# Patient Record
Sex: FEMALE | Race: WHITE
[De-identification: names, ages, dates, MRNs, and addresses within clinical notes are randomized per-mention and may not be internally consistent; named-entity substitution may affect disease eponyms.]

---

## 2023-07-07 ENCOUNTER — HOSPITAL ENCOUNTER (OUTPATIENT)
Dept: HOSPITAL 47 - LABMAIN | Age: 63
Discharge: HOME | End: 2023-07-07
Attending: EMERGENCY MEDICINE
Payer: COMMERCIAL

## 2023-07-07 ENCOUNTER — HOSPITAL ENCOUNTER (INPATIENT)
Dept: HOSPITAL 47 - 3MHU | Age: 63
LOS: 6 days | Discharge: HOME | DRG: 750 | End: 2023-07-13
Attending: PSYCHIATRY & NEUROLOGY | Admitting: PSYCHIATRY & NEUROLOGY
Payer: MEDICAID

## 2023-07-07 DIAGNOSIS — F20.0: Primary | ICD-10-CM

## 2023-07-07 DIAGNOSIS — N39.0: ICD-10-CM

## 2023-07-07 DIAGNOSIS — Z91.040: ICD-10-CM

## 2023-07-07 DIAGNOSIS — K52.1: ICD-10-CM

## 2023-07-07 DIAGNOSIS — Z20.822: Primary | ICD-10-CM

## 2023-07-07 DIAGNOSIS — Z79.899: ICD-10-CM

## 2023-07-07 DIAGNOSIS — B96.1: ICD-10-CM

## 2023-07-07 DIAGNOSIS — T36.95XA: ICD-10-CM

## 2023-07-07 DIAGNOSIS — Z71.41: ICD-10-CM

## 2023-07-07 DIAGNOSIS — Z71.51: ICD-10-CM

## 2023-07-07 DIAGNOSIS — R35.0: ICD-10-CM

## 2023-07-07 DIAGNOSIS — I10: ICD-10-CM

## 2023-07-07 DIAGNOSIS — E78.5: ICD-10-CM

## 2023-07-07 DIAGNOSIS — G47.00: ICD-10-CM

## 2023-07-07 DIAGNOSIS — N39.41: ICD-10-CM

## 2023-07-07 PROCEDURE — 80061 LIPID PANEL: CPT

## 2023-07-07 PROCEDURE — 87186 SC STD MICRODIL/AGAR DIL: CPT

## 2023-07-07 PROCEDURE — 81001 URINALYSIS AUTO W/SCOPE: CPT

## 2023-07-07 PROCEDURE — 84443 ASSAY THYROID STIM HORMONE: CPT

## 2023-07-07 PROCEDURE — 87077 CULTURE AEROBIC IDENTIFY: CPT

## 2023-07-07 PROCEDURE — 87086 URINE CULTURE/COLONY COUNT: CPT

## 2023-07-07 PROCEDURE — 83036 HEMOGLOBIN GLYCOSYLATED A1C: CPT

## 2023-07-07 PROCEDURE — 87635 SARS-COV-2 COVID-19 AMP PRB: CPT

## 2023-07-07 RX ADMIN — ATORVASTATIN CALCIUM SCH MG: 10 TABLET, FILM COATED ORAL at 21:02

## 2023-07-07 RX ADMIN — NICOTINE SCH: 14 PATCH, EXTENDED RELEASE TRANSDERMAL at 17:26

## 2023-07-08 LAB
CHOLEST SERPL-MCNC: 223 MG/DL (ref 0–200)
HDLC SERPL-MCNC: 30.5 MG/DL (ref 40–60)
LDLC SERPL CALC-MCNC: 148.3 MG/DL (ref 0–131)
PH UR: 8.5 [PH] (ref 5–8)
RBC UR QL: 2 /HPF (ref 0–5)
SP GR UR: 1.01 (ref 1–1.03)
TRIGL SERPL-MCNC: 221 MG/DL (ref 0–149)
UROBILINOGEN UR QL STRIP: <2 MG/DL (ref ?–2)
VLDLC SERPL CALC-MCNC: 44.2 MG/DL (ref 5–40)
WBC #/AREA URNS HPF: 71 /HPF (ref 0–5)

## 2023-07-08 RX ADMIN — NICOTINE SCH: 14 PATCH, EXTENDED RELEASE TRANSDERMAL at 08:52

## 2023-07-08 RX ADMIN — ATORVASTATIN CALCIUM SCH MG: 10 TABLET, FILM COATED ORAL at 21:05

## 2023-07-08 RX ADMIN — METOPROLOL SUCCINATE SCH MG: 50 TABLET, EXTENDED RELEASE ORAL at 08:52

## 2023-07-08 NOTE — P.CONS
History of Present Illness





- Reason for Consult


Consult date: 07/08/23





- History of Present Illness





The patient is a 62-year-old female with a PMH of hyperlipidemia who was 

transferred from an outside facility with the patient was brought in for 

paranoid behavior.  The patient was seen at the mental health unit.  She reports

that Genaro Nettles the  is trying to get her killed.  She reports 

that he has caused collapse of the economy many times and that the staff at the 

hospital and others in her neighborhood are all working with him to plan her 

assassination.  She denied any physical complaints at the time of interview.  

Denied experiencing chest discomfort, shortness of breath, fever, chills, cough,

nausea, vomiting, abdominal pain, diarrhea.  She denies tobacco, alcohol, or 

substance use.





Review of systems:


Pertinent positives and negatives as discussed in HPI, a complete review of 

systems was performed and all other systems are negative.





Physical examination:


General: non toxic, no distress, appears at stated age, normal weight


Derm: no unusual rashes/lesions, no unusual ecchymoses, warm, dry


Head: atraumatic, normocephalic, symmetric


Eyes: EOMI, no lid lag, anicteric sclera


ENT: Nose and ears atraumatic, no thrush,  no pharyngeal erythema


Neck: trachea midline, supple


Mouth: no lip lesion, mucus membranes moist


Cardiovascular: S1S2 reg, no murmur, no edema


Lungs: CTA bilateral, no rhonchi, no rales , no accessory muscle use


Abdominal: soft,  nontender to palpation, no guarding


Ext: no gross muscle atrophy, no contractures, 


Neuro: No gross focal neuro deficits noted 


Psych: Alert, oriented, appropriate affect 





Assessment:


Chronic conditions: Hyper lipidemia


Paranoid delusion





Imaging:


None performed





Plan:


Continue home medications Lipitor and Lopressor


Defer management of delusion to primary psychiatry service





Thank you for allowing us to participate in the care of this patient.  We will 

follow peripherally.  Do not hesitate to contact us with questions.  Someone can

be reached from the [facility] hospitalist group at all hours of the day 

at [phone].





Past Medical History


History of Any Multi-Drug Resistant Organisms: None Reported


Smoking Status: Never smoker





- Past Family History


  ** Father


Family Medical History: Unable to Obtain (patient refused)





Medications and Allergies


                                Home Medications











 Medication  Instructions  Recorded  Confirmed  Type


 


Atorvastatin Calcium 10 mg PO HS 07/07/23 07/07/23 History


 


Metoprolol Tartrate [Lopressor] 50 mg PO DAILY 07/07/23 07/07/23 History


 


busPIRone HCL [Buspirone HCl] 15 mg PO TID 07/07/23 07/07/23 History








                                    Allergies











Allergy/AdvReac Type Severity Reaction Status Date / Time


 


latex Allergy  Unknown Verified 07/07/23 15:57














Physical Exam


Vitals: 


                                   Vital Signs











  Temp Pulse Pulse Resp BP BP


 


 07/07/23 21:04    95   148/79 


 


 07/07/23 18:24  97.3 F L  90   16   140/80


 


 07/07/23 18:08  97.3 F L  90   16   140/80








                                Intake and Output











 07/07/23 07/07/23 07/08/23





 14:59 22:59 06:59


 


Other:   


 


  Weight  71.804 kg

## 2023-07-08 NOTE — P.HP
Psychiatric H&P





- .


H&P Date: 07/08/23


History & Physical: 


                                    Allergies











Allergy/AdvReac Type Severity Reaction Status Date / Time


 


latex Allergy  Unknown Verified 07/07/23 15:57








                                   Vital Signs











Temp  97.6 F   07/08/23 09:00


 


Pulse  99   07/08/23 09:00


 


Resp  16   07/08/23 09:00


 


BP  128/76   07/08/23 09:00


 


Pulse Ox      


 


FiO2      








                                 Intake & Output











 07/07/23 07/08/23 07/08/23





 18:59 06:59 18:59


 


Weight 71.804 kg  











07/08/23 11:30


IDENTIFYING DATA: Patient is a 62-year-old  female who lives with her 

dad.  She is admitted for delusions: She is paranoid stating that Genaro Eli 

is trying to kick kill her and that she caused the collapse of the economy





HPI: patient states that Genaro Eli has hired has tried to kill her many times

since 2021  She states that Genaro Eli has lost billions of dollars because of

her.  She states that 50-60 cars followed her. she denies suicidal thoughts, 

homicidal thoughts, current or past hallucinations (although petition reports 

hallucinations), current or past manic episodes. Also denies substance use





PAST PSYCHIATRIC HISTORY: reports that she has been hospitalized for a similar 

presentation in the past.  States that she is on BuSpar 15 mg 3 times a day, 

Seroquel (which she states works well but she keeps running out of, last took it

one week ago), Ambien, and Xanax. Per [facility], had Ambien filled on 5/31/23, 30 day

supply. [Patient denies any psychiatric outpatient follow-up.] [Patient denies 

any history of suicide attempts in the past.]





PMH:[denies]





ALLERGIES: as per EMR





CHEMICAL DEPENDENCY HISTORY: as per HPI





FAMILY PSYCHIATRIC/SUBSTANCE USE HISTORY:  states that mom was in the 

psychiatric hospital for paranoia and she also received ECT.  States that her 

father states that she is having similar symptoms to her mother





SOCIAL HISTORY: lives with her father.  Denies any legal issues





MENTAL STATUS EXAM: 


General Appearance: Patient appears to be stated age is alert, [directable, and 

attempts to cooperate]. 


Behavior: cooperative


Speech: Patient's speech is [fluent and nonpressured.]


Mood/Affect: Patient reports their mood is "okay", affect is congruent and 

constricted. 


Suicidality/Homicidality:  Patient denies having any homicidal ideation intent 

or plan. [Denies any suicidal ideations intent or plan]  


Perceptions: Patient denies any visual hallucinations [and denies any auditory 

hallucinations]


Though content/process: significant paranoia and grandiosity


Memory and concentration: AOX3, grossly intact for the purposes of this session.




Judgment and insight: [poor]





STRENGTHS/WEAKNESSES: strength is that patient has good support system. Weakness

 is that patient [has poor judgment and is impulsive]





INTELLECT: [average]





IMPRESSIONS: 62-year-old  female who presented to the hospital with 

delusions.  There is no history of psychosis per chart.  Per history, patient 

appears to have schizophrenia.





PLAN: 


-Patient is admitted under [voluntary] status to MHU for stabilization of 

psychiatric symptoms and safety. 


-Medications : seroquel 100 mg bid, buspar (home med) 15 mg tid


-Ativan [and Haldol] PRN for agitation/aggression


-Patient was informed of the risks, benefits and side effects of the medication 

and patient verbally consented to taking the medications. Patient signed med 

consent form and was placed in chart.


-Internal Medicine consult to perform medical evaluation and physical.


-SW on board for discharge planning. Encourage patient to participate in groups 

to work on coping skills. [Will await deferral and court date.] []


07/08/23 11:31

## 2023-07-09 RX ADMIN — ZOLPIDEM TARTRATE PRN MG: 5 TABLET ORAL at 22:51

## 2023-07-09 RX ADMIN — METOPROLOL SUCCINATE SCH MG: 50 TABLET, EXTENDED RELEASE ORAL at 08:37

## 2023-07-09 RX ADMIN — ATORVASTATIN CALCIUM SCH MG: 10 TABLET, FILM COATED ORAL at 22:50

## 2023-07-09 RX ADMIN — NICOTINE SCH: 14 PATCH, EXTENDED RELEASE TRANSDERMAL at 08:38

## 2023-07-09 NOTE — P.PN
Progress Note - Text





Interval history: 


Patient was seen in her room and was directable and agreeable to speak with 

writer.  Continues to voice paranoia about Genaro Eli.  States that she heard 

his car or engines last night.  States that towards harasses really mad that she

is admitted here. At this time patient denies any suicidal or homicidal 

ideations intent or plan. Denies any visual hallucinations. Patient denies any 

side effects from the medications and has been compliant with meds. 





Mental status exam: 


General Appearance: [Patient appears to be stated age is alert, directable, and 

cooperative.] 


Behavior: [No agitated behavior. Patient is calm and directable]


Speech: Patient's speech is fluent and nonpressured. 


Mood/Affect: Full range of affect


Suicidality/Homicidality:  Patient denies having any suicidal or homicidal 

ideation intent or plan.  


Perceptions: Patient denies any auditory or visual hallucinations currently


Though content/process: Continues to be paranoid and grandiose


Memory and concentration: AOX3, grossly intact for the purposes of this session


Judgment and insight: Poor





Assessment/Plan: Continue with current diagnosis. Patient continues to meet 

criteria for inpatient psychiatric admission for symptom stabilization and 

safety.[  And Ambien when necessary.  Permax patient has been on this me

dication.  There is no concern for prescription abuse.  Continue all other 

medications.]  Monitor for medication compliance and for any psychotropic 

medication side effects. Will continue to monitor ongoing response to treatment.

 Encouraged participation in milieu.

## 2023-07-10 RX ADMIN — ZOLPIDEM TARTRATE PRN MG: 5 TABLET ORAL at 22:05

## 2023-07-10 RX ADMIN — ATORVASTATIN CALCIUM SCH MG: 10 TABLET, FILM COATED ORAL at 22:04

## 2023-07-10 RX ADMIN — METOPROLOL SUCCINATE SCH MG: 50 TABLET, EXTENDED RELEASE ORAL at 09:21

## 2023-07-10 NOTE — P.PN
Progress Note - Text


Progress Note Date: 07/10/23





Interval history:


Patient was seen laying in bed today and was agreeable to speak with writer in 

the office today. She states that she was brought in on friday to the hospital 

because one of [employer] associates was trying to track her down and kill her. 

she states that he has the "capacity to take down country economies". she was 

rambling at times, difficult to redirect, loose associations. she was endorsing 

significant paranoia, multiple delusions. States that she is did not sleep well 

last night and was fairly focused on ativan and ambien. has been mainly keeping 

to herself on the unit. poor reality testing, poor insight and judgment. at this

time she is denying any AH or VH and denies any Si or HI.





Mental status examination:


General Appearance: Patient appears to be stated age is alert, has short hair, 

poor eye contact.


Behavior: cooperative, difficult to redirect at times


Speech: Patient's speech is fluent and nonpressured.  Rambles.


Mood/Affect: Patient reports their mood is "ok", affect is congruent and 

constricted. 


Suicidality/Homicidality:  Patient denies having any homicidal ideation intent 

or plan. Denies any suicidal ideations intent or plan  


Perceptions: Patient denies any visual hallucinations and denies any auditory 

hallucinations


Though content/process: significant paranoia and grandiosity, multiple 

delusions. 


Memory and concentration: AOX3, grossly intact for the purposes of this session.




Judgment and insight: poor





IMPRESSIONS: 


schizophrenia





PLAN: 


-Patient is admitted under voluntary status to MHU for stabilization of 

psychiatric symptoms and safety. 


-Medications : increase seroquel 200 mg qhs for psychosis/insomnia. decrease 

ambien to 5 mg qhs prn for insomnia, buspar (home med) 15 mg tid for anxiety. 


-Ativan and Haldol PRN for agitation/aggression


-Patient was informed of the risks, benefits and side effects of the medication 

and patient verbally consented to taking the medications. Patient signed med 

consent form and was placed in chart.


-Internal Medicine consult to perform medical evaluation and physical.


-SW on board for discharge planning. Encourage patient to participate in groups 

to work on coping skills. likely discharge back home in 2-3 days.

## 2023-07-11 RX ADMIN — CEPHALEXIN SCH MG: 500 CAPSULE ORAL at 21:29

## 2023-07-11 RX ADMIN — ATORVASTATIN CALCIUM SCH MG: 10 TABLET, FILM COATED ORAL at 23:07

## 2023-07-11 RX ADMIN — CEPHALEXIN SCH MG: 500 CAPSULE ORAL at 18:53

## 2023-07-11 RX ADMIN — ZOLPIDEM TARTRATE PRN MG: 5 TABLET ORAL at 23:07

## 2023-07-11 RX ADMIN — METOPROLOL SUCCINATE SCH MG: 50 TABLET, EXTENDED RELEASE ORAL at 09:19

## 2023-07-11 NOTE — P.PN
Progress Note - Text


Progress Note Date: 07/11/23





Interval history:


Patient was seen in her room today and was agreeable to seek to writer in the 

office.  She appeared to be less preoccupied with any delusions today, was more 

appropriate during the interaction.  Her eye contact has been improving today.  

She claims that she has been dealing with urge incontinence at times and asked 

to use depends for her bladder while on the unit.  She claims that her mood and 

anxiety even improving.  We reviewed her medications and it appears that she 

took Ativan, Ambien and also Seroquel last night and claims that she 

"overslept".  We spoke about discontinuing the Ativan when necessary and also 

lowering the dose of Ambien when necessary.  Patient states that she SLEEP 

without it today.  Claims that she has been going to groups and try to 

participate.  She is denying any paranoia today.  Delusions have been improving.

 Improving reality testing, poor insight and judgment. at this time she is 

denying any AH or VH and denies any Si or HI.





Mental status examination:


General Appearance: Patient appears to be stated age is alert, has short hair, 

improving eye contact.


Behavior: cooperative, more directable today.


Speech: Patient's speech is fluent and nonpressured.  Rambles.  More 

appropriate.


Mood/Affect: Patient reports their mood is "alright", affect is congruent and 

constricted. 


Suicidality/Homicidality:  Patient denies having any homicidal ideation intent 

or plan. Denies any suicidal ideations intent or plan  


Perceptions: Patient denies any visual hallucinations and denies any auditory 

hallucinations


Though content/process: Not endorsing any paranoia and grandiosity, less 

preoccupied with delusions today.  More goal oriented.


Memory and concentration: AOX3, grossly intact for the purposes of this session.




Judgment and insight: poor, improving mildly





IMPRESSIONS: 


schizophrenia





PLAN: 


-Patient is admitted under voluntary status to MHU for stabilization of 

psychiatric symptoms and safety. 


-Medications : seroquel 200 mg qhs for psychosis/insomnia. decrease ambien 5 mg 

qhs prn for insomnia, added trazodone 50 mg qhs prn for insomnia. d/c ativan 

prn. continue buspar (home med) 15 mg tid for anxiety. 


-Ativan and Haldol PRN for agitation/aggression


-SW on board for discharge planning. Encourage patient to participate in groups 

to work on coping skills. likely discharge back home in 1-2 days.

## 2023-07-11 NOTE — P.PN
Subjective


Progress Note Date: 07/11/23





Hospital course: 





Patient is a 62-year-old female with a past medical history of hypertension, 

hyperlipidemia, and paranoid schizophrenia.  She is currently admitted to 

inpatient psychiatric unit for treatment of psychosis and delusional/paranoid 

behaviors. 





Went to mental health unit to discuss laboratory findings with patient.  Upon 

review of chart patient's urinalysis positive for infection and urine culture 

positive for Klebsiella pneumoniae.  In addition patient's hemoglobin A1c was 

elevated at 6.5% and lipid profile elevated with triglycerides of 221, total 

cholesterol 223, , VLDL of 44.2 and HDL of 30.50.  Had long discussion 

with patient and educated her on these findings.  Patient reports she was 

previously on atorvastatin but stopped taking for months and was started back on

this medication a few days ago.  Patient educated on the importance of following

a heart healthy and carb consistent diet.  He was also noted that hemoglobin A1c

was elevated at 6.5% and educated patient on the importance of controlling blood

glucose levels with local hemoglobin A1c of less than 6%.  Recommend patient to 

receive continued encouragement on importance of following a heart healthy and 

carb consistent diet.  When asked about urinary tract symptoms, patient reports 

she has had urinary frequency and urgency off and on for the past couple months.

 Patient started on Keflex 500 mg by mouth 4 times daily for 5 days for 

treatment of this UTI.





Physical exam:





Vital signs reviewed and stable. 


General: Nontoxic, no distress and appears stated age.  


Derm: Skin warm and dry, normal coloration for ethnicity.


Head: Atraumatic, normocephalic and symmetric.  


Eyes: EOMs intact, no lid lag, and anicteric sclera


Mouth: no lip lesions, mucus membranes moist


Cardiovascular: regular rate and rhythm with normal S1S2, no murmur, positive 

posterior tibial pulses bilaterally, and cap refill < 2 seconds.  


Lungs: Respirations even, regular, and unlabored on room air. Lungs CTA 

bilaterally, no rhonchi, no rales, no wheezing, and no accessory muscle usage. 


Abdominal: soft, nontender to palpation, no guarding, no appreciable 

organomegaly


Ext: ROM intact. No gross muscle atrophy, no edema, no contractures


Neuro: Speech clear, face symmetrical and CN II-XII grossly intact with no noted

focal neuro deficits


Psych: Alert and oriented to person, place, time, and situation. Appropriate and

pleasant affect. 





Assessment and Plan of Care:





Klebsiella pneumoniae UTI


-Patient started on Keflex 500 mg every 6 hours 5 days for treatment of UTI.





Elevated hemoglobin A1c 6.5%


-Diabetes education was provided and patient's diet change from regular diet to 

heart healthy and carb consistent.





Hyperlipidemia


-Patient was restarted on her daily home medication regimen with atorvastatin 10

mg nightly.





Paranoid schizophrenia with delusional behaviors


-Continued management per psychiatric team.








Thank you for allowing us to participate in the care of this pleasant patient.  

Do not hesitate to contact us with questions.  Someone can be reached from the 

[facility] hospitalist group all hours of the day at [phone] or via 

[facility].


Patient was seen independently by Nurse Practitioner.


This document was prepared using Dragon dictation software.  Please allow for 

errors in transcription while rare they do occur.


I reviewed the documentation as provided by the LORIE above, who is the original 

author of this note.  I agree with the documented assessment and plan, with the 

following changes: none





Objective





- Vital Signs


Vital signs: 


                                   Vital Signs











Temp  97.9 F   07/10/23 23:00


 


Pulse  101 H  07/11/23 09:21


 


Resp  16   07/10/23 23:00


 


BP  115/67   07/11/23 09:21


 


Pulse Ox  97   07/10/23 23:00


 


FiO2      














- Labs


Labs: 


                      Microbiology - Last 24 Hours (Table)











 07/08/23 16:45 Urine Culture - Final





 Urine,Clean Catch    Klebsiella pneumoniae

## 2023-07-12 RX ADMIN — METOPROLOL SUCCINATE SCH MG: 50 TABLET, EXTENDED RELEASE ORAL at 08:52

## 2023-07-12 RX ADMIN — CEPHALEXIN SCH MG: 500 CAPSULE ORAL at 17:42

## 2023-07-12 RX ADMIN — CEPHALEXIN SCH MG: 500 CAPSULE ORAL at 21:56

## 2023-07-12 RX ADMIN — ATORVASTATIN CALCIUM SCH MG: 10 TABLET, FILM COATED ORAL at 21:56

## 2023-07-12 RX ADMIN — CEPHALEXIN SCH MG: 500 CAPSULE ORAL at 08:52

## 2023-07-12 RX ADMIN — ZOLPIDEM TARTRATE PRN MG: 5 TABLET ORAL at 21:55

## 2023-07-12 RX ADMIN — CEPHALEXIN SCH MG: 500 CAPSULE ORAL at 12:18

## 2023-07-12 RX ADMIN — Medication SCH EACH: at 21:57

## 2023-07-12 NOTE — P.PN
Progress Note - Text


Progress Note Date: 07/12/23





Interval history:


Patient was seen in her room today and was agreeable to seek to writer in the 

office.  she claims that overall she is doing better. she was started on Keflex 

yesterday by medicine and claims that her UTI sx are improving yet she is having

some stomach discomfort and mild diarrhea secondary to abx. She appeared to be 

less preoccupied with any delusions today and less preoccupied with politics, 

was more appropriate during the interaction.  Her eye contact has been improving

today. She claims that her mood and anxiety improving.  Patient states that she 

slept better last night.  Claims that she has been going to groups and try to 

participate.  She is denying any paranoia today.  Delusions have been improving.

 Improving reality testing, poor insight and judgment. at this time she is 

denying any AH or VH and denies any Si or HI.





Mental status examination:


General Appearance: Patient appears to be stated age is alert, has short hair, 

improving eye contact.


Behavior: cooperative, more directable today.


Speech: Patient's speech is fluent and nonpressured.  Rambles.  More 

appropriate.


Mood/Affect: Patient reports their mood is "ok", affect is congruent and 

constricted, improving mildly


Suicidality/Homicidality:  Patient denies having any homicidal ideation intent o

r plan. Denies any suicidal ideations intent or plan  


Perceptions: Patient denies any visual hallucinations and denies any auditory 

hallucinations


Though content/process: Not endorsing any paranoia and grandiosity, less 

preoccupied with politics today.  More goal oriented.


Memory and concentration: AOX3, grossly intact for the purposes of this session.




Judgment and insight: improving mildly





IMPRESSIONS: 


schizophrenia





PLAN: 


-Patient is admitted under voluntary status to MHU for stabilization of 

psychiatric symptoms and safety. 


-Medications : seroquel 200 mg qhs for psychosis/insomnia. continue ambien 5 mg 

qhs prn for insomnia, trazodone 50 mg qhs prn for insomnia. continue buspar 15 

mg tid for anxiety. 


-Ativan and Haldol PRN for agitation/aggression


-SW on board for discharge planning. Encourage patient to participate in groups 

to work on coping skills. likely discharge back home tomorrow if patient 

continues to improve. father will be able to pick her up, SW to help arrange for

this.

## 2023-07-13 VITALS
RESPIRATION RATE: 16 BRPM | SYSTOLIC BLOOD PRESSURE: 134 MMHG | DIASTOLIC BLOOD PRESSURE: 83 MMHG | HEART RATE: 73 BPM | TEMPERATURE: 97.3 F

## 2023-07-13 RX ADMIN — CEPHALEXIN SCH MG: 500 CAPSULE ORAL at 09:02

## 2023-07-13 RX ADMIN — METOPROLOL SUCCINATE SCH MG: 50 TABLET, EXTENDED RELEASE ORAL at 09:02

## 2023-07-13 RX ADMIN — CEPHALEXIN SCH MG: 500 CAPSULE ORAL at 13:25

## 2023-07-13 RX ADMIN — Medication SCH EACH: at 09:02
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
